# Patient Record
Sex: MALE | Race: WHITE | NOT HISPANIC OR LATINO | Employment: OTHER | URBAN - METROPOLITAN AREA
[De-identification: names, ages, dates, MRNs, and addresses within clinical notes are randomized per-mention and may not be internally consistent; named-entity substitution may affect disease eponyms.]

---

## 2023-09-28 ENCOUNTER — HOSPITAL ENCOUNTER (EMERGENCY)
Facility: HOSPITAL | Age: 23
Discharge: HOME | End: 2023-09-28
Attending: EMERGENCY MEDICINE
Payer: OTHER GOVERNMENT

## 2023-09-28 VITALS
DIASTOLIC BLOOD PRESSURE: 87 MMHG | OXYGEN SATURATION: 98 % | SYSTOLIC BLOOD PRESSURE: 120 MMHG | HEIGHT: 68 IN | WEIGHT: 175 LBS | RESPIRATION RATE: 20 BRPM | BODY MASS INDEX: 26.52 KG/M2 | TEMPERATURE: 98 F | HEART RATE: 99 BPM

## 2023-09-28 DIAGNOSIS — I82.611 SUPERFICIAL VENOUS THROMBOSIS OF RIGHT UPPER EXTREMITY: Primary | ICD-10-CM

## 2023-09-28 PROCEDURE — 99283 EMERGENCY DEPT VISIT LOW MDM: CPT

## 2023-09-28 PROCEDURE — 25000003 PHARM REV CODE 250: Performed by: NURSE PRACTITIONER

## 2023-09-28 RX ORDER — KETOROLAC TROMETHAMINE 10 MG/1
10 TABLET, FILM COATED ORAL
Status: COMPLETED | OUTPATIENT
Start: 2023-09-28 | End: 2023-09-28

## 2023-09-28 RX ORDER — KETOROLAC TROMETHAMINE 10 MG/1
TABLET, FILM COATED ORAL
Qty: 15 TABLET | Refills: 0 | Status: SHIPPED | OUTPATIENT
Start: 2023-09-28

## 2023-09-28 RX ORDER — KETOROLAC TROMETHAMINE 10 MG/1
TABLET, FILM COATED ORAL
Qty: 15 TABLET | Refills: 0 | Status: SHIPPED | OUTPATIENT
Start: 2023-09-28 | End: 2023-09-28 | Stop reason: SDUPTHER

## 2023-09-28 RX ADMIN — KETOROLAC TROMETHAMINE 10 MG: 10 TABLET, FILM COATED ORAL at 07:09

## 2023-09-29 NOTE — ED PROVIDER NOTES
"Encounter Date: 9/28/2023       History     Chief Complaint   Patient presents with    Mass     Pt c/o 3 "hard lumps" on R arm x4 weeks.      POV the ED with friend.  Patient complains of "3" knots in his right upper arm for 4 weeks.  He has not been treated for this before today.  History of left shoulder surgery by Dr Jarquin in [place] 08/21/23.  Patient states that he had an IV in the right forearm. He noticed the knots shortly after his IV for his surgery.  He denies fever vomiting.  He denies chest pain or shortness a breath.  Denies feeling faint weak or dizzy.  States the area is not tender.  No other complaints    The history is provided by the patient.     Review of patient's allergies indicates:  No Known Allergies  History reviewed. No pertinent past medical history.  History reviewed. No pertinent surgical history.  History reviewed. No pertinent family history.  Social History     Tobacco Use    Smoking status: Never   Substance Use Topics    Alcohol use: Yes     Comment: socially    Drug use: Never     Review of Systems   Constitutional:  Negative for chills and fever.   Respiratory:  Negative for cough and shortness of breath.    Cardiovascular:  Negative for chest pain.   Gastrointestinal:  Negative for abdominal pain, diarrhea, nausea and vomiting.   Musculoskeletal:  Negative for joint swelling.        "3" knots right forearm/upper arm for 4 weeks, IV in the right forearm for surgery 08/21/23   All other systems reviewed and are negative.      Physical Exam     Initial Vitals [09/28/23 1843]   BP Pulse Resp Temp SpO2   120/87 (!) 115 20 98 °F (36.7 °C) 97 %      MAP       --         Physical Exam    Nursing note and vitals reviewed.  Constitutional: He appears well-developed and well-nourished. No distress.   HENT:   Head: Normocephalic and atraumatic.   Mouth/Throat: Oropharynx is clear and moist.   Eyes: Pupils are equal, round, and reactive to light.   Neck:   Normal range of " motion.  Cardiovascular:  Normal rate and regular rhythm.           Noted 3 small bb sized nodules right basilic vein. All all proximal to his reported IV site. No redness, no warmth, no engorgement of the vein. The vein is soft, nontender   Pulmonary/Chest: Breath sounds normal. No respiratory distress.   Abdominal: Abdomen is soft. There is no abdominal tenderness.   Musculoskeletal:         General: Normal range of motion.      Cervical back: Normal range of motion.      Comments: Splint left shoulder from recent surgery     Neurological: He is alert and oriented to person, place, and time. He has normal strength. GCS score is 15. GCS eye subscore is 4. GCS verbal subscore is 5. GCS motor subscore is 6.   Skin: Skin is warm and dry. Capillary refill takes 2 to 3 seconds.   Psychiatric: He has a normal mood and affect. Thought content normal.         ED Course   Procedures  Labs Reviewed - No data to display       Imaging Results    None          Medications   ketorolac tablet 10 mg (10 mg Oral Given 9/28/23 1935)     Medical Decision Making  Presents for evaluation of nodule right arm after IV for left shoulder surgery 08/21/23.  Disposition pending  Differentials including but not limited to SVT, DVT, phlebitis  Plan of care: Patient will be discharged home follow up clinic.  Superficial SVT noted.  No phlebitis.  No redness or warmth.  Toradol in the ED. instructed patient to take NSAIDs warm compresses to follow up with clinic.  To return as needed.  He agrees with plan of care, discussed with Dr Henao    Risk  Prescription drug management.                               Clinical Impression:   Final diagnoses:  [I82.611] Superficial venous thrombosis of right upper extremity (Primary)        ED Disposition Condition    Discharge Stable          ED Prescriptions       Medication Sig Dispense Start Date End Date Auth. Provider    ketorolac (TORADOL) 10 mg tablet  (Status: Discontinued) ONE TAB PO TID PRN PAIN,  MAX 30 MG PER DAY FOR 5 DAYS TOTAL USE 15 tablet 9/28/2023 9/28/2023 Jeanette Mariano NP    ketorolac (TORADOL) 10 mg tablet ONE TAB PO TID PRN PAIN, MAX 30 MG PER DAY FOR 5 DAYS TOTAL USE 15 tablet 9/28/2023 -- Jeanette Mariano NP          Follow-up Information       Follow up With Specialties Details Why Contact Info    Tulio Jarquin MD Orthopedic Surgery Call in 3 days For office recheck [address] [facility]  [address]  [place] [address]  [phone]               Jeanette Mariano NP  09/28/23 2051

## 2023-09-29 NOTE — DISCHARGE INSTRUCTIONS
Rest, increase fluids, lots of water and liquids.  Continue with postop care per your orthopedic office.  Apply warm compresses to the right forearm and upper arm as needed.  Return as needed